# Patient Record
Sex: MALE | Race: WHITE | Employment: OTHER | URBAN - NONMETROPOLITAN AREA
[De-identification: names, ages, dates, MRNs, and addresses within clinical notes are randomized per-mention and may not be internally consistent; named-entity substitution may affect disease eponyms.]

---

## 2018-08-02 ENCOUNTER — HOSPITAL ENCOUNTER (OUTPATIENT)
Age: 58
Setting detail: OBSERVATION
Discharge: HOME | End: 2018-08-05
Attending: EMERGENCY MEDICINE | Admitting: INTERNAL MEDICINE
Payer: COMMERCIAL

## 2018-08-02 ENCOUNTER — APPOINTMENT (OUTPATIENT)
Dept: GENERAL RADIOLOGY | Age: 58
End: 2018-08-02
Payer: COMMERCIAL

## 2018-08-02 DIAGNOSIS — R07.89 CHEST DISCOMFORT: Primary | ICD-10-CM

## 2018-08-02 PROBLEM — R07.9 CHEST PAIN: Status: ACTIVE | Noted: 2018-08-02

## 2018-08-02 LAB
ALBUMIN SERPL-MCNC: 4 G/DL (ref 3.5–5.2)
ALP BLD-CCNC: 53 U/L (ref 40–130)
ALT SERPL-CCNC: 51 U/L (ref 5–41)
ANION GAP SERPL CALCULATED.3IONS-SCNC: 14 MMOL/L (ref 7–19)
AST SERPL-CCNC: 36 U/L (ref 5–40)
BASOPHILS ABSOLUTE: 0 K/UL (ref 0–0.2)
BASOPHILS RELATIVE PERCENT: 0.4 % (ref 0–1)
BILIRUB SERPL-MCNC: 0.5 MG/DL (ref 0.2–1.2)
BUN BLDV-MCNC: 13 MG/DL (ref 6–20)
CALCIUM SERPL-MCNC: 9.1 MG/DL (ref 8.6–10)
CHLORIDE BLD-SCNC: 101 MMOL/L (ref 98–111)
CO2: 20 MMOL/L (ref 22–29)
CREAT SERPL-MCNC: 0.8 MG/DL (ref 0.5–1.2)
EOSINOPHILS ABSOLUTE: 0 K/UL (ref 0–0.6)
EOSINOPHILS RELATIVE PERCENT: 0.5 % (ref 0–5)
GFR NON-AFRICAN AMERICAN: >60
GLUCOSE BLD-MCNC: 111 MG/DL (ref 70–99)
GLUCOSE BLD-MCNC: 116 MG/DL (ref 74–109)
HCT VFR BLD CALC: 47.4 % (ref 42–52)
HEMOGLOBIN: 16.3 G/DL (ref 14–18)
LYMPHOCYTES ABSOLUTE: 1.4 K/UL (ref 1.1–4.5)
LYMPHOCYTES RELATIVE PERCENT: 17 % (ref 20–40)
MCH RBC QN AUTO: 31.2 PG (ref 27–31)
MCHC RBC AUTO-ENTMCNC: 34.4 G/DL (ref 33–37)
MCV RBC AUTO: 90.8 FL (ref 80–94)
MONOCYTES ABSOLUTE: 0.7 K/UL (ref 0–0.9)
MONOCYTES RELATIVE PERCENT: 9.2 % (ref 0–10)
NEUTROPHILS ABSOLUTE: 5.8 K/UL (ref 1.5–7.5)
NEUTROPHILS RELATIVE PERCENT: 72.2 % (ref 50–65)
PDW BLD-RTO: 12.6 % (ref 11.5–14.5)
PERFORMED ON: ABNORMAL
PLATELET # BLD: 198 K/UL (ref 130–400)
PMV BLD AUTO: 8.9 FL (ref 9.4–12.4)
POTASSIUM SERPL-SCNC: 3.6 MMOL/L (ref 3.5–5)
RBC # BLD: 5.22 M/UL (ref 4.7–6.1)
SODIUM BLD-SCNC: 135 MMOL/L (ref 136–145)
TOTAL PROTEIN: 6.9 G/DL (ref 6.6–8.7)
TROPONIN: <0.01 NG/ML (ref 0–0.03)
TROPONIN: <0.01 NG/ML (ref 0–0.03)
WBC # BLD: 8.1 K/UL (ref 4.8–10.8)

## 2018-08-02 PROCEDURE — 93005 ELECTROCARDIOGRAM TRACING: CPT

## 2018-08-02 PROCEDURE — 6360000002 HC RX W HCPCS: Performed by: EMERGENCY MEDICINE

## 2018-08-02 PROCEDURE — 80053 COMPREHEN METABOLIC PANEL: CPT

## 2018-08-02 PROCEDURE — G0378 HOSPITAL OBSERVATION PER HR: HCPCS

## 2018-08-02 PROCEDURE — 96372 THER/PROPH/DIAG INJ SC/IM: CPT

## 2018-08-02 PROCEDURE — 82948 REAGENT STRIP/BLOOD GLUCOSE: CPT

## 2018-08-02 PROCEDURE — 99285 EMERGENCY DEPT VISIT HI MDM: CPT | Performed by: EMERGENCY MEDICINE

## 2018-08-02 PROCEDURE — 6370000000 HC RX 637 (ALT 250 FOR IP): Performed by: INTERNAL MEDICINE

## 2018-08-02 PROCEDURE — 71045 X-RAY EXAM CHEST 1 VIEW: CPT

## 2018-08-02 PROCEDURE — 99220 PR INITIAL OBSERVATION CARE/DAY 70 MINUTES: CPT | Performed by: INTERNAL MEDICINE

## 2018-08-02 PROCEDURE — 6360000002 HC RX W HCPCS: Performed by: INTERNAL MEDICINE

## 2018-08-02 PROCEDURE — 84484 ASSAY OF TROPONIN QUANT: CPT

## 2018-08-02 PROCEDURE — 99285 EMERGENCY DEPT VISIT HI MDM: CPT

## 2018-08-02 PROCEDURE — 85025 COMPLETE CBC W/AUTO DIFF WBC: CPT

## 2018-08-02 PROCEDURE — 6370000000 HC RX 637 (ALT 250 FOR IP): Performed by: HOSPITALIST

## 2018-08-02 PROCEDURE — 2580000003 HC RX 258: Performed by: INTERNAL MEDICINE

## 2018-08-02 PROCEDURE — 36415 COLL VENOUS BLD VENIPUNCTURE: CPT

## 2018-08-02 RX ORDER — SODIUM CHLORIDE 0.9 % (FLUSH) 0.9 %
10 SYRINGE (ML) INJECTION PRN
Status: DISCONTINUED | OUTPATIENT
Start: 2018-08-02 | End: 2018-08-05 | Stop reason: SDUPTHER

## 2018-08-02 RX ORDER — DOCUSATE SODIUM 100 MG/1
100 CAPSULE, LIQUID FILLED ORAL 2 TIMES DAILY
Status: DISCONTINUED | OUTPATIENT
Start: 2018-08-02 | End: 2018-08-05 | Stop reason: HOSPADM

## 2018-08-02 RX ORDER — VARDENAFIL HYDROCHLORIDE 20 MG/1
20 TABLET ORAL PRN
COMMUNITY

## 2018-08-02 RX ORDER — VITAMIN B COMPLEX
1 CAPSULE ORAL DAILY
COMMUNITY

## 2018-08-02 RX ORDER — ACETAMINOPHEN 325 MG/1
650 TABLET ORAL EVERY 4 HOURS PRN
Status: DISCONTINUED | OUTPATIENT
Start: 2018-08-02 | End: 2018-08-05 | Stop reason: HOSPADM

## 2018-08-02 RX ORDER — INSULIN GLARGINE 100 [IU]/ML
75 INJECTION, SOLUTION SUBCUTANEOUS NIGHTLY
COMMUNITY

## 2018-08-02 RX ORDER — LANOLIN ALCOHOL/MO/W.PET/CERES
3 CREAM (GRAM) TOPICAL NIGHTLY PRN
Status: DISCONTINUED | OUTPATIENT
Start: 2018-08-02 | End: 2018-08-05 | Stop reason: HOSPADM

## 2018-08-02 RX ORDER — REPAGLINIDE 1 MG/1
1 TABLET ORAL
COMMUNITY

## 2018-08-02 RX ORDER — OMEPRAZOLE 20 MG/1
20 CAPSULE, DELAYED RELEASE ORAL DAILY
COMMUNITY

## 2018-08-02 RX ORDER — MAGNESIUM OXIDE 400 MG/1
400 TABLET ORAL DAILY
COMMUNITY

## 2018-08-02 RX ORDER — PIOGLITAZONEHYDROCHLORIDE 15 MG/1
15 TABLET ORAL DAILY
COMMUNITY

## 2018-08-02 RX ORDER — VALSARTAN 160 MG/1
160 TABLET ORAL DAILY
COMMUNITY

## 2018-08-02 RX ORDER — MORPHINE SULFATE 1 MG/ML
4 INJECTION, SOLUTION EPIDURAL; INTRATHECAL; INTRAVENOUS ONCE
Status: COMPLETED | OUTPATIENT
Start: 2018-08-02 | End: 2018-08-02

## 2018-08-02 RX ORDER — SIMETHICONE 80 MG
80 TABLET,CHEWABLE ORAL EVERY 6 HOURS PRN
COMMUNITY

## 2018-08-02 RX ORDER — SODIUM CHLORIDE 0.9 % (FLUSH) 0.9 %
10 SYRINGE (ML) INJECTION EVERY 12 HOURS SCHEDULED
Status: DISCONTINUED | OUTPATIENT
Start: 2018-08-02 | End: 2018-08-05 | Stop reason: SDUPTHER

## 2018-08-02 RX ORDER — PANTOPRAZOLE SODIUM 40 MG/1
40 TABLET, DELAYED RELEASE ORAL
Status: DISCONTINUED | OUTPATIENT
Start: 2018-08-03 | End: 2018-08-05 | Stop reason: HOSPADM

## 2018-08-02 RX ORDER — GABAPENTIN 600 MG/1
800 TABLET ORAL 3 TIMES DAILY
COMMUNITY

## 2018-08-02 RX ORDER — FLUOXETINE 10 MG/1
10 CAPSULE ORAL DAILY
Status: DISCONTINUED | OUTPATIENT
Start: 2018-08-02 | End: 2018-08-05 | Stop reason: HOSPADM

## 2018-08-02 RX ORDER — DOCUSATE SODIUM 100 MG/1
100 CAPSULE, LIQUID FILLED ORAL 2 TIMES DAILY
COMMUNITY

## 2018-08-02 RX ORDER — CYCLOBENZAPRINE HCL 10 MG
10 TABLET ORAL 3 TIMES DAILY PRN
Status: DISCONTINUED | OUTPATIENT
Start: 2018-08-02 | End: 2018-08-05 | Stop reason: HOSPADM

## 2018-08-02 RX ORDER — CYCLOBENZAPRINE HCL 10 MG
10 TABLET ORAL 3 TIMES DAILY PRN
COMMUNITY

## 2018-08-02 RX ORDER — VALSARTAN 160 MG/1
160 TABLET ORAL DAILY
Status: DISCONTINUED | OUTPATIENT
Start: 2018-08-02 | End: 2018-08-05 | Stop reason: HOSPADM

## 2018-08-02 RX ORDER — ATORVASTATIN CALCIUM 10 MG/1
10 TABLET, FILM COATED ORAL DAILY
COMMUNITY

## 2018-08-02 RX ORDER — MORPHINE SULFATE 1 MG/ML
2 INJECTION, SOLUTION EPIDURAL; INTRATHECAL; INTRAVENOUS ONCE
Status: COMPLETED | OUTPATIENT
Start: 2018-08-02 | End: 2018-08-02

## 2018-08-02 RX ORDER — ONDANSETRON 2 MG/ML
4 INJECTION INTRAMUSCULAR; INTRAVENOUS EVERY 6 HOURS PRN
Status: DISCONTINUED | OUTPATIENT
Start: 2018-08-02 | End: 2018-08-05 | Stop reason: HOSPADM

## 2018-08-02 RX ORDER — FLUOXETINE 10 MG/1
10 CAPSULE ORAL DAILY
COMMUNITY

## 2018-08-02 RX ORDER — ATORVASTATIN CALCIUM 10 MG/1
10 TABLET, FILM COATED ORAL DAILY
Status: DISCONTINUED | OUTPATIENT
Start: 2018-08-02 | End: 2018-08-05 | Stop reason: HOSPADM

## 2018-08-02 RX ORDER — GABAPENTIN 400 MG/1
800 CAPSULE ORAL 3 TIMES DAILY
Status: DISCONTINUED | OUTPATIENT
Start: 2018-08-02 | End: 2018-08-05 | Stop reason: HOSPADM

## 2018-08-02 RX ORDER — SIMETHICONE 80 MG
80 TABLET,CHEWABLE ORAL EVERY 6 HOURS PRN
Status: DISCONTINUED | OUTPATIENT
Start: 2018-08-02 | End: 2018-08-05 | Stop reason: HOSPADM

## 2018-08-02 RX ORDER — ASPIRIN 81 MG/1
81 TABLET, CHEWABLE ORAL DAILY
Status: DISCONTINUED | OUTPATIENT
Start: 2018-08-03 | End: 2018-08-02 | Stop reason: SDUPTHER

## 2018-08-02 RX ORDER — NITROGLYCERIN 0.4 MG/1
0.4 TABLET SUBLINGUAL EVERY 5 MIN PRN
Status: DISCONTINUED | OUTPATIENT
Start: 2018-08-02 | End: 2018-08-05 | Stop reason: HOSPADM

## 2018-08-02 RX ORDER — ACETAMINOPHEN 160 MG/5ML
650 SOLUTION ORAL ONCE
Status: COMPLETED | OUTPATIENT
Start: 2018-08-02 | End: 2018-08-02

## 2018-08-02 RX ORDER — ASPIRIN 81 MG/1
81 TABLET, CHEWABLE ORAL DAILY
Status: DISCONTINUED | OUTPATIENT
Start: 2018-08-02 | End: 2018-08-05 | Stop reason: HOSPADM

## 2018-08-02 RX ORDER — ONDANSETRON 2 MG/ML
4 INJECTION INTRAMUSCULAR; INTRAVENOUS ONCE
Status: COMPLETED | OUTPATIENT
Start: 2018-08-02 | End: 2018-08-02

## 2018-08-02 RX ADMIN — ONDANSETRON 4 MG: 2 INJECTION INTRAMUSCULAR; INTRAVENOUS at 14:18

## 2018-08-02 RX ADMIN — ENOXAPARIN SODIUM 40 MG: 40 INJECTION SUBCUTANEOUS at 22:03

## 2018-08-02 RX ADMIN — DOCUSATE SODIUM 100 MG: 100 CAPSULE, LIQUID FILLED ORAL at 22:03

## 2018-08-02 RX ADMIN — FLUOXETINE 10 MG: 10 CAPSULE ORAL at 22:03

## 2018-08-02 RX ADMIN — VALSARTAN 160 MG: 160 TABLET, FILM COATED ORAL at 22:04

## 2018-08-02 RX ADMIN — MELATONIN 3 MG ORAL TABLET 3 MG: 3 TABLET ORAL at 23:18

## 2018-08-02 RX ADMIN — Medication 10 ML: at 19:56

## 2018-08-02 RX ADMIN — GABAPENTIN 800 MG: 400 CAPSULE ORAL at 19:55

## 2018-08-02 RX ADMIN — ACETAMINOPHEN 650 MG: 325 SOLUTION ORAL at 23:17

## 2018-08-02 RX ADMIN — Medication 2 MG: at 15:15

## 2018-08-02 RX ADMIN — ATORVASTATIN CALCIUM 10 MG: 10 TABLET, FILM COATED ORAL at 22:03

## 2018-08-02 RX ADMIN — ACETAMINOPHEN 650 MG: 325 TABLET ORAL at 19:54

## 2018-08-02 RX ADMIN — Medication 4 MG: at 14:18

## 2018-08-02 RX ADMIN — CYCLOBENZAPRINE HYDROCHLORIDE 10 MG: 10 TABLET, FILM COATED ORAL at 19:56

## 2018-08-02 RX ADMIN — ASPIRIN 81 MG CHEWABLE TABLET 81 MG: 81 TABLET CHEWABLE at 22:04

## 2018-08-02 ASSESSMENT — PAIN DESCRIPTION - LOCATION
LOCATION: CHEST;BACK;LEG
LOCATION: BACK;CHEST;LEG

## 2018-08-02 ASSESSMENT — PAIN DESCRIPTION - PAIN TYPE
TYPE: ACUTE PAIN

## 2018-08-02 ASSESSMENT — PAIN SCALES - GENERAL
PAINLEVEL_OUTOF10: 5
PAINLEVEL_OUTOF10: 5
PAINLEVEL_OUTOF10: 6
PAINLEVEL_OUTOF10: 5
PAINLEVEL_OUTOF10: 8
PAINLEVEL_OUTOF10: 8
PAINLEVEL_OUTOF10: 4
PAINLEVEL_OUTOF10: 5
PAINLEVEL_OUTOF10: 8
PAINLEVEL_OUTOF10: 4
PAINLEVEL_OUTOF10: 2

## 2018-08-02 ASSESSMENT — ENCOUNTER SYMPTOMS
BACK PAIN: 1
SHORTNESS OF BREATH: 1
ABDOMINAL PAIN: 0
COUGH: 0
NAUSEA: 1
VOMITING: 1

## 2018-08-02 NOTE — ED PROVIDER NOTES
[facility] EMERGENCY DEPT  eMERGENCY dEPARTMENT eNCOUnter      Pt Name: Sherry Ferris  MRN: [id number]  [place] 1960  Date of evaluation: 8/2/2018  Provider: Fanny Hernandez MD    [address] [place] [address]       Chief Complaint   Patient presents with    Chest Pain     Neck/shooulder pain with vomiting          HISTORY OF PRESENT ILLNESS   (Location/Symptom, Timing/Onset, Context/Setting, Quality, Duration, Modifying Factors, Severity)  Note limiting factors. Sherry Ferris is a 62 y.o. male who presents to the emergency department      The history is provided by the patient. Chest Pain   Pain location:  L chest and R chest  Pain quality: pressure    Radiates to: chronic neck \"spasms\", relative rubbing neck, \"VA gives him Morphine for it\"  Pain severity now: 8/10. Onset quality:  Sudden  Duration: 1130. Timing:  Constant  Progression:  Unchanged  Chronicity:  New  Context comment:  No past cath, unknown last stress  Relieved by:  None tried  Worsened by:  Nothing  Ineffective treatments:  Nitroglycerin and aspirin (en route)  Associated symptoms: back pain (chronic), diaphoresis, fatigue, nausea, numbness (bilateral hands, ? hyperventilation), shortness of breath, vomiting (x 1) and weakness    Associated symptoms: no abdominal pain, no cough, no headache, no lower extremity edema, no near-syncope and no palpitations        Nursing Notes were reviewed. REVIEW OF SYSTEMS    (2-9 systems for level 4, 10 or more for level 5)     Review of Systems   Constitutional: Positive for diaphoresis and fatigue. Respiratory: Positive for shortness of breath. Negative for cough. Cardiovascular: Positive for chest pain. Negative for palpitations and near-syncope. Gastrointestinal: Positive for nausea and vomiting (x 1). Negative for abdominal pain. Musculoskeletal: Positive for back pain (chronic). Neurological: Positive for weakness and numbness (bilateral hands, ? hyperventilation). Negative for headaches.    All other systems reviewed and are negative. Except as noted above the remainder of the review of systems was reviewed and negative. PAST MEDICAL HISTORY     Past Medical History:   Diagnosis Date    [facility]     Cerebral artery occlusion with cerebral infarction ([facility]     Depression     Diabetes mellitus ([facility]     Headache     Hypertension     PTSD (post-traumatic stress disorder)     TBI (traumatic brain injury) ([facility]          SURGICAL HISTORY       Past Surgical History:   Procedure Laterality Date    ELBOW SURGERY Bilateral     SHOULDER SURGERY Bilateral          CURRENT MEDICATIONS       Previous Medications    ASPIRIN 81 MG TABLET    Take 81 mg by mouth daily    ATORVASTATIN (LIPITOR) 10 MG TABLET    Take 10 mg by mouth daily    B COMPLEX VITAMINS CAPSULE    Take 1 capsule by mouth daily    CYCLOBENZAPRINE (FLEXERIL) 10 MG TABLET    Take 10 mg by mouth 3 times daily as needed for Muscle spasms    DOCUSATE SODIUM (COLACE) 100 MG CAPSULE    Take 100 mg by mouth 2 times daily    FLUOXETINE (PROZAC) 10 MG CAPSULE    Take 10 mg by mouth daily    FLUTICASONE FUROATE NA    by Nasal route    GABAPENTIN (NEURONTIN) 600 MG TABLET    Take 800 mg by mouth 3 times daily. .    INSULIN GLARGINE (LANTUS) 100 UNIT/ML INJECTION VIAL    Inject into the skin nightly    LIRAGLUTIDE (VICTOZA SC)    Inject into the skin    MAGNESIUM OXIDE (MAG-OX) 400 MG TABLET    Take 400 mg by mouth daily    METFORMIN (GLUCOPHAGE) 500 MG TABLET    Take 500 mg by mouth 2 times daily (with meals)    OMEPRAZOLE (PRILOSEC) 20 MG DELAYED RELEASE CAPSULE    Take 20 mg by mouth daily    PIOGLITAZONE (ACTOS) 15 MG TABLET    Take 15 mg by mouth daily    REPAGLINIDE (PRANDIN) 1 MG TABLET    Take 1 mg by mouth 3 times daily (before meals)    SIMETHICONE (MYLICON) 80 MG CHEWABLE TABLET    Take 80 mg by mouth every 6 hours as needed for Flatulence    VALSARTAN (DIOVAN) 160 MG TABLET    Take 160 mg by mouth daily    VARDENAFIL otherwise noted below, none     Procedures    FINAL IMPRESSION      1. Chest discomfort          DISPOSITION/PLAN   DISPOSITION        PATIENT REFERRED TO:  No follow-up provider specified.     DISCHARGE MEDICATIONS:  New Prescriptions    No medications on file          (Please note that portions of this note were completed with a voice recognition program.  Efforts were made to edit the dictations but occasionally words are mis-transcribed.)    Kobe Merino MD (electronically signed)  Attending Emergency Physician          Kobe Merino MD  08/02/18 [id number]

## 2018-08-02 NOTE — ED NOTES
Bed: 18  Expected date:   Expected time:   Means of arrival:   Comments:  malik Sheikh RN  08/02/18 [id number]

## 2018-08-02 NOTE — ED NOTES
No bed request for admission found Dr Pruitt Never notified and request made Awaiting room assignment for further      Denise Martini RN  08/02/18 8694

## 2018-08-03 ENCOUNTER — APPOINTMENT (OUTPATIENT)
Dept: NUCLEAR MEDICINE | Age: 58
End: 2018-08-03
Payer: COMMERCIAL

## 2018-08-03 LAB
GLUCOSE BLD-MCNC: 137 MG/DL (ref 70–99)
GLUCOSE BLD-MCNC: 167 MG/DL (ref 70–99)
GLUCOSE BLD-MCNC: 81 MG/DL (ref 70–99)
GLUCOSE BLD-MCNC: 92 MG/DL (ref 70–99)
PERFORMED ON: ABNORMAL
PERFORMED ON: ABNORMAL
PERFORMED ON: NORMAL
PERFORMED ON: NORMAL

## 2018-08-03 PROCEDURE — 93017 CV STRESS TEST TRACING ONLY: CPT

## 2018-08-03 PROCEDURE — 2700000000 HC OXYGEN THERAPY PER DAY

## 2018-08-03 PROCEDURE — 2580000003 HC RX 258: Performed by: INTERNAL MEDICINE

## 2018-08-03 PROCEDURE — 96372 THER/PROPH/DIAG INJ SC/IM: CPT

## 2018-08-03 PROCEDURE — 78452 HT MUSCLE IMAGE SPECT MULT: CPT

## 2018-08-03 PROCEDURE — 99224 PR SBSQ OBSERVATION CARE/DAY 15 MINUTES: CPT | Performed by: INTERNAL MEDICINE

## 2018-08-03 PROCEDURE — G0378 HOSPITAL OBSERVATION PER HR: HCPCS

## 2018-08-03 PROCEDURE — 82948 REAGENT STRIP/BLOOD GLUCOSE: CPT

## 2018-08-03 PROCEDURE — 6370000000 HC RX 637 (ALT 250 FOR IP): Performed by: HOSPITALIST

## 2018-08-03 PROCEDURE — 6370000000 HC RX 637 (ALT 250 FOR IP): Performed by: INTERNAL MEDICINE

## 2018-08-03 PROCEDURE — 6360000002 HC RX W HCPCS: Performed by: INTERNAL MEDICINE

## 2018-08-03 PROCEDURE — A9500 TC99M SESTAMIBI: HCPCS | Performed by: INTERNAL MEDICINE

## 2018-08-03 PROCEDURE — 3430000000 HC RX DIAGNOSTIC RADIOPHARMACEUTICAL: Performed by: INTERNAL MEDICINE

## 2018-08-03 RX ORDER — NICOTINE POLACRILEX 4 MG
15 LOZENGE BUCCAL PRN
Status: DISCONTINUED | OUTPATIENT
Start: 2018-08-03 | End: 2018-08-05 | Stop reason: HOSPADM

## 2018-08-03 RX ORDER — DEXTROSE MONOHYDRATE 25 G/50ML
12.5 INJECTION, SOLUTION INTRAVENOUS PRN
Status: DISCONTINUED | OUTPATIENT
Start: 2018-08-03 | End: 2018-08-05 | Stop reason: HOSPADM

## 2018-08-03 RX ORDER — DEXTROSE MONOHYDRATE 50 MG/ML
100 INJECTION, SOLUTION INTRAVENOUS PRN
Status: DISCONTINUED | OUTPATIENT
Start: 2018-08-03 | End: 2018-08-05 | Stop reason: HOSPADM

## 2018-08-03 RX ADMIN — INSULIN LISPRO 3 UNITS: 100 INJECTION, SOLUTION INTRAVENOUS; SUBCUTANEOUS at 23:08

## 2018-08-03 RX ADMIN — VALSARTAN 160 MG: 160 TABLET, FILM COATED ORAL at 11:28

## 2018-08-03 RX ADMIN — MELATONIN 3 MG ORAL TABLET 3 MG: 3 TABLET ORAL at 21:06

## 2018-08-03 RX ADMIN — ATORVASTATIN CALCIUM 10 MG: 10 TABLET, FILM COATED ORAL at 11:27

## 2018-08-03 RX ADMIN — DOCUSATE SODIUM 100 MG: 100 CAPSULE, LIQUID FILLED ORAL at 20:57

## 2018-08-03 RX ADMIN — CYCLOBENZAPRINE HYDROCHLORIDE 10 MG: 10 TABLET, FILM COATED ORAL at 21:06

## 2018-08-03 RX ADMIN — CYCLOBENZAPRINE HYDROCHLORIDE 10 MG: 10 TABLET, FILM COATED ORAL at 11:33

## 2018-08-03 RX ADMIN — DOCUSATE SODIUM 100 MG: 100 CAPSULE, LIQUID FILLED ORAL at 11:28

## 2018-08-03 RX ADMIN — GABAPENTIN 800 MG: 400 CAPSULE ORAL at 20:57

## 2018-08-03 RX ADMIN — Medication 10 ML: at 11:29

## 2018-08-03 RX ADMIN — GABAPENTIN 800 MG: 400 CAPSULE ORAL at 11:28

## 2018-08-03 RX ADMIN — Medication 10 ML: at 20:58

## 2018-08-03 RX ADMIN — ENOXAPARIN SODIUM 40 MG: 40 INJECTION SUBCUTANEOUS at 16:53

## 2018-08-03 RX ADMIN — PANTOPRAZOLE SODIUM 40 MG: 40 TABLET, DELAYED RELEASE ORAL at 05:51

## 2018-08-03 RX ADMIN — GABAPENTIN 800 MG: 400 CAPSULE ORAL at 16:53

## 2018-08-03 RX ADMIN — FLUOXETINE 10 MG: 10 CAPSULE ORAL at 11:28

## 2018-08-03 RX ADMIN — TETRAKIS(2-METHOXYISOBUTYLISOCYANIDE)COPPER(I) TETRAFLUOROBORATE 30 MILLICURIE: 1 INJECTION, POWDER, LYOPHILIZED, FOR SOLUTION INTRAVENOUS at 11:11

## 2018-08-03 RX ADMIN — REGADENOSON 0.4 MG: 0.08 INJECTION, SOLUTION INTRAVENOUS at 11:11

## 2018-08-03 RX ADMIN — TETRAKIS(2-METHOXYISOBUTYLISOCYANIDE)COPPER(I) TETRAFLUOROBORATE 10 MILLICURIE: 1 INJECTION, POWDER, LYOPHILIZED, FOR SOLUTION INTRAVENOUS at 11:11

## 2018-08-03 RX ADMIN — ASPIRIN 81 MG CHEWABLE TABLET 81 MG: 81 TABLET CHEWABLE at 11:28

## 2018-08-03 ASSESSMENT — PAIN SCALES - GENERAL
PAINLEVEL_OUTOF10: 0

## 2018-08-03 NOTE — ED NOTES
Pt report called to PCU to Kiko BARBOZA to take over pt care Pt admission in progress      Bell Ring RN  08/02/18 4111

## 2018-08-04 LAB
GLUCOSE BLD-MCNC: 122 MG/DL (ref 70–99)
GLUCOSE BLD-MCNC: 153 MG/DL (ref 70–99)
GLUCOSE BLD-MCNC: 203 MG/DL (ref 70–99)
GLUCOSE BLD-MCNC: 228 MG/DL (ref 70–99)
PERFORMED ON: ABNORMAL

## 2018-08-04 PROCEDURE — G0378 HOSPITAL OBSERVATION PER HR: HCPCS

## 2018-08-04 PROCEDURE — 6370000000 HC RX 637 (ALT 250 FOR IP): Performed by: INTERNAL MEDICINE

## 2018-08-04 PROCEDURE — 96376 TX/PRO/DX INJ SAME DRUG ADON: CPT

## 2018-08-04 PROCEDURE — 2580000003 HC RX 258: Performed by: INTERNAL MEDICINE

## 2018-08-04 PROCEDURE — 2700000000 HC OXYGEN THERAPY PER DAY

## 2018-08-04 PROCEDURE — 6360000002 HC RX W HCPCS: Performed by: INTERNAL MEDICINE

## 2018-08-04 PROCEDURE — 6370000000 HC RX 637 (ALT 250 FOR IP): Performed by: HOSPITALIST

## 2018-08-04 PROCEDURE — 99205 OFFICE O/P NEW HI 60 MIN: CPT | Performed by: INTERNAL MEDICINE

## 2018-08-04 PROCEDURE — 99225 PR SBSQ OBSERVATION CARE/DAY 25 MINUTES: CPT | Performed by: INTERNAL MEDICINE

## 2018-08-04 PROCEDURE — 96375 TX/PRO/DX INJ NEW DRUG ADDON: CPT

## 2018-08-04 PROCEDURE — 82948 REAGENT STRIP/BLOOD GLUCOSE: CPT

## 2018-08-04 PROCEDURE — 96372 THER/PROPH/DIAG INJ SC/IM: CPT

## 2018-08-04 PROCEDURE — 96374 THER/PROPH/DIAG INJ IV PUSH: CPT

## 2018-08-04 RX ORDER — SODIUM CHLORIDE 0.9 % (FLUSH) 0.9 %
10 SYRINGE (ML) INJECTION PRN
Status: DISCONTINUED | OUTPATIENT
Start: 2018-08-04 | End: 2018-08-05 | Stop reason: SDUPTHER

## 2018-08-04 RX ORDER — HYDROCODONE BITARTRATE AND ACETAMINOPHEN 5; 325 MG/1; MG/1
1 TABLET ORAL EVERY 4 HOURS PRN
Status: DISCONTINUED | OUTPATIENT
Start: 2018-08-04 | End: 2018-08-05 | Stop reason: HOSPADM

## 2018-08-04 RX ORDER — SODIUM CHLORIDE 0.9 % (FLUSH) 0.9 %
10 SYRINGE (ML) INJECTION EVERY 12 HOURS SCHEDULED
Status: DISCONTINUED | OUTPATIENT
Start: 2018-08-04 | End: 2018-08-05 | Stop reason: SDUPTHER

## 2018-08-04 RX ADMIN — Medication 10 ML: at 09:25

## 2018-08-04 RX ADMIN — FLUOXETINE 10 MG: 10 CAPSULE ORAL at 09:26

## 2018-08-04 RX ADMIN — INSULIN LISPRO 3 UNITS: 100 INJECTION, SOLUTION INTRAVENOUS; SUBCUTANEOUS at 11:54

## 2018-08-04 RX ADMIN — Medication 10 ML: at 20:06

## 2018-08-04 RX ADMIN — ENOXAPARIN SODIUM 40 MG: 40 INJECTION SUBCUTANEOUS at 17:16

## 2018-08-04 RX ADMIN — GABAPENTIN 400 MG: 400 CAPSULE ORAL at 20:04

## 2018-08-04 RX ADMIN — DOCUSATE SODIUM 100 MG: 100 CAPSULE, LIQUID FILLED ORAL at 20:04

## 2018-08-04 RX ADMIN — ASPIRIN 81 MG CHEWABLE TABLET 81 MG: 81 TABLET CHEWABLE at 09:25

## 2018-08-04 RX ADMIN — GABAPENTIN 800 MG: 400 CAPSULE ORAL at 09:25

## 2018-08-04 RX ADMIN — INSULIN LISPRO 3 UNITS: 100 INJECTION, SOLUTION INTRAVENOUS; SUBCUTANEOUS at 22:06

## 2018-08-04 RX ADMIN — ATORVASTATIN CALCIUM 10 MG: 10 TABLET, FILM COATED ORAL at 09:25

## 2018-08-04 RX ADMIN — VALSARTAN 160 MG: 160 TABLET, FILM COATED ORAL at 09:25

## 2018-08-04 RX ADMIN — CYCLOBENZAPRINE HYDROCHLORIDE 10 MG: 10 TABLET, FILM COATED ORAL at 20:04

## 2018-08-04 RX ADMIN — PANTOPRAZOLE SODIUM 40 MG: 40 TABLET, DELAYED RELEASE ORAL at 09:28

## 2018-08-04 RX ADMIN — INSULIN LISPRO 6 UNITS: 100 INJECTION, SOLUTION INTRAVENOUS; SUBCUTANEOUS at 17:17

## 2018-08-04 RX ADMIN — DOCUSATE SODIUM 100 MG: 100 CAPSULE, LIQUID FILLED ORAL at 09:25

## 2018-08-04 RX ADMIN — GABAPENTIN 800 MG: 400 CAPSULE ORAL at 14:46

## 2018-08-04 RX ADMIN — HYDROCODONE BITARTRATE AND ACETAMINOPHEN 1 TABLET: 5; 325 TABLET ORAL at 20:04

## 2018-08-04 ASSESSMENT — PAIN DESCRIPTION - LOCATION: LOCATION: BACK

## 2018-08-04 ASSESSMENT — PAIN SCALES - GENERAL
PAINLEVEL_OUTOF10: 0
PAINLEVEL_OUTOF10: 6
PAINLEVEL_OUTOF10: 5

## 2018-08-04 ASSESSMENT — PAIN DESCRIPTION - ORIENTATION: ORIENTATION: LOWER

## 2018-08-04 ASSESSMENT — PAIN DESCRIPTION - PAIN TYPE: TYPE: CHRONIC PAIN

## 2018-08-05 VITALS
SYSTOLIC BLOOD PRESSURE: 130 MMHG | OXYGEN SATURATION: 96 % | WEIGHT: 279.3 LBS | DIASTOLIC BLOOD PRESSURE: 88 MMHG | TEMPERATURE: 98.2 F | HEIGHT: 71 IN | RESPIRATION RATE: 16 BRPM | BODY MASS INDEX: 39.1 KG/M2 | HEART RATE: 75 BPM

## 2018-08-05 LAB
ANION GAP SERPL CALCULATED.3IONS-SCNC: 10 MMOL/L (ref 7–19)
BUN BLDV-MCNC: 13 MG/DL (ref 6–20)
CALCIUM SERPL-MCNC: 8.7 MG/DL (ref 8.6–10)
CHLORIDE BLD-SCNC: 101 MMOL/L (ref 98–111)
CO2: 28 MMOL/L (ref 22–29)
CREAT SERPL-MCNC: 1 MG/DL (ref 0.5–1.2)
EKG P AXIS: 56 DEGREES
EKG P AXIS: 68 DEGREES
EKG P-R INTERVAL: 140 MS
EKG P-R INTERVAL: 168 MS
EKG Q-T INTERVAL: 374 MS
EKG Q-T INTERVAL: 386 MS
EKG QRS DURATION: 104 MS
EKG QRS DURATION: 106 MS
EKG QTC CALCULATION (BAZETT): 412 MS
EKG QTC CALCULATION (BAZETT): 421 MS
EKG T AXIS: 29 DEGREES
EKG T AXIS: 30 DEGREES
GFR NON-AFRICAN AMERICAN: >60
GLUCOSE BLD-MCNC: 122 MG/DL (ref 70–99)
GLUCOSE BLD-MCNC: 148 MG/DL (ref 74–109)
PERFORMED ON: ABNORMAL
POTASSIUM REFLEX MAGNESIUM: 4.4 MMOL/L (ref 3.5–5)
SODIUM BLD-SCNC: 139 MMOL/L (ref 136–145)

## 2018-08-05 PROCEDURE — C1894 INTRO/SHEATH, NON-LASER: HCPCS

## 2018-08-05 PROCEDURE — 93458 L HRT ARTERY/VENTRICLE ANGIO: CPT | Performed by: INTERNAL MEDICINE

## 2018-08-05 PROCEDURE — 2500000003 HC RX 250 WO HCPCS

## 2018-08-05 PROCEDURE — G0378 HOSPITAL OBSERVATION PER HR: HCPCS

## 2018-08-05 PROCEDURE — 99225 PR SBSQ OBSERVATION CARE/DAY 25 MINUTES: CPT | Performed by: INTERNAL MEDICINE

## 2018-08-05 PROCEDURE — 80048 BASIC METABOLIC PNL TOTAL CA: CPT

## 2018-08-05 PROCEDURE — 2580000003 HC RX 258: Performed by: INTERNAL MEDICINE

## 2018-08-05 PROCEDURE — 6370000000 HC RX 637 (ALT 250 FOR IP): Performed by: INTERNAL MEDICINE

## 2018-08-05 PROCEDURE — 2709999900 HC NON-CHARGEABLE SUPPLY

## 2018-08-05 PROCEDURE — C1760 CLOSURE DEV, VASC: HCPCS

## 2018-08-05 PROCEDURE — 93005 ELECTROCARDIOGRAM TRACING: CPT

## 2018-08-05 PROCEDURE — 82948 REAGENT STRIP/BLOOD GLUCOSE: CPT

## 2018-08-05 PROCEDURE — 99024 POSTOP FOLLOW-UP VISIT: CPT | Performed by: INTERNAL MEDICINE

## 2018-08-05 PROCEDURE — 36415 COLL VENOUS BLD VENIPUNCTURE: CPT

## 2018-08-05 PROCEDURE — 6360000002 HC RX W HCPCS

## 2018-08-05 RX ORDER — SODIUM CHLORIDE 0.9 % (FLUSH) 0.9 %
10 SYRINGE (ML) INJECTION PRN
Status: DISCONTINUED | OUTPATIENT
Start: 2018-08-05 | End: 2018-08-05 | Stop reason: HOSPADM

## 2018-08-05 RX ORDER — SODIUM CHLORIDE 0.9 % (FLUSH) 0.9 %
10 SYRINGE (ML) INJECTION EVERY 12 HOURS SCHEDULED
Status: DISCONTINUED | OUTPATIENT
Start: 2018-08-05 | End: 2018-08-05 | Stop reason: HOSPADM

## 2018-08-05 RX ADMIN — DOCUSATE SODIUM 100 MG: 100 CAPSULE, LIQUID FILLED ORAL at 08:37

## 2018-08-05 RX ADMIN — FLUOXETINE 10 MG: 10 CAPSULE ORAL at 08:37

## 2018-08-05 RX ADMIN — CYCLOBENZAPRINE HYDROCHLORIDE 10 MG: 10 TABLET, FILM COATED ORAL at 05:49

## 2018-08-05 RX ADMIN — PANTOPRAZOLE SODIUM 40 MG: 40 TABLET, DELAYED RELEASE ORAL at 05:49

## 2018-08-05 RX ADMIN — ATORVASTATIN CALCIUM 10 MG: 10 TABLET, FILM COATED ORAL at 08:37

## 2018-08-05 RX ADMIN — GABAPENTIN 400 MG: 400 CAPSULE ORAL at 08:37

## 2018-08-05 RX ADMIN — HYDROCODONE BITARTRATE AND ACETAMINOPHEN 1 TABLET: 5; 325 TABLET ORAL at 00:04

## 2018-08-05 RX ADMIN — VALSARTAN 160 MG: 160 TABLET, FILM COATED ORAL at 08:37

## 2018-08-05 RX ADMIN — Medication 10 ML: at 08:37

## 2018-08-05 RX ADMIN — HYDROCODONE BITARTRATE AND ACETAMINOPHEN 1 TABLET: 5; 325 TABLET ORAL at 05:49

## 2018-08-05 RX ADMIN — ASPIRIN 81 MG CHEWABLE TABLET 81 MG: 81 TABLET CHEWABLE at 08:37

## 2018-08-05 ASSESSMENT — PAIN DESCRIPTION - PAIN TYPE
TYPE: CHRONIC PAIN
TYPE: CHRONIC PAIN

## 2018-08-05 ASSESSMENT — PAIN DESCRIPTION - ORIENTATION
ORIENTATION: LOWER
ORIENTATION: LOWER

## 2018-08-05 ASSESSMENT — PAIN SCALES - GENERAL
PAINLEVEL_OUTOF10: 5
PAINLEVEL_OUTOF10: 0
PAINLEVEL_OUTOF10: 7

## 2018-08-05 ASSESSMENT — PAIN DESCRIPTION - LOCATION
LOCATION: BACK
LOCATION: BACK

## 2018-08-05 NOTE — CONSULTS
[facility]      History and Physical           I personally saw the patient and rounded with:  Chapincito Gomez, on  8/4/18      The observations documented in this note, including the assessment and plan are mine              Date of Admission:  8/2/2018  1:26 PM    Date of Initially Being Seen / Consultation:  8/4/18    Cardiologist:  Buddy Kumar MD     Cardiology Attending: [facility] Attending: Hospitalist Service      PCP:  No primary care provider on file. Reason for Consultation or Admission / Chief Complaint:  Chest discomfort    SUBJECTIVE AND HISTORY OF PRESENT ILLNESS:    Source of the history:  Patient, family, previous inpatient and outpatient records in [facility]    Mario Selby is a 62 y.o. male who presents to [facility] ED / PCU with symptoms / signs / problem or diagnosis of chest discomfort. He is visiting [address] camping an developed chest discomfort. The discomfort began in the central chest and radiated to his back. There was dyspnea. The chest discomfort is not exertional and there are no obvious precipitating or relieving features. There were no associated manifestations such as nausea, vomiting, diaphoresis, presyncope, syncope, dizzyness, weakness, cold sweatsWith these symptoms and signs, Cardiology was asked to assist in treatment. When being examined this afternoon, he has had no symptoms of exertional chest discomfort, unusual or change in shortness of air, presyncope or syncope.        Family present:  Yes: wife and daughter      CARDIAC RISK PROFILE:    Risk Factor Yes / No / Unknown       Gender Male   Cigarette Use No   Family History of Cardiovascular Disease N/A   Diabetes Mellitus yes   Hypercholesteremia no   Hypertension yes          Cardiac Specific Problems:    Specialty Problems     None            PRIOR CARDIAC PROBLEM LIST  (IF APPLICABLE):    none      Past Medical History:    Past Medical History:   Diagnosis Date    [facility]     Cerebral [facility] occlusion [facility] infarction [facility]     Depression     Diabetes mellitus ([facility]     Headache     Hypertension     PTSD (post-traumatic stress disorder)     TBI (traumatic [facility] injury) ([facility]          Past Surgical History:    Past Surgical History:   Procedure Laterality Date    ELBOW SURGERY Bilateral     SHOULDER SURGERY Bilateral          Home Medications:   Prior to Admission medications    Medication Sig Start Date End Date Taking? Authorizing Provider   insulin glargine (LANTUS) 100 UNIT/ML injection vial Inject 75 Units into the skin nightly    Yes Historical Provider, MD   Liraglutide (VICTOZA SC) Inject into the skin   Yes Historical Provider, MD   cyclobenzaprine (FLEXERIL) 10 MG tablet Take 10 mg by mouth 3 times daily as needed for Muscle spasms   Yes Historical Provider, MD   gabapentin (NEURONTIN) 600 MG tablet Take 800 mg by mouth 3 times daily. .   Yes Historical Provider, MD   atorvastatin (LIPITOR) 10 MG tablet Take 10 mg by mouth daily   Yes Historical Provider, MD   repaglinide (PRANDIN) 1 MG tablet Take 1 mg by mouth 3 times daily (before meals)   Yes Historical Provider, MD   vitamin D 1000 units CAPS Take by mouth   Yes Historical Provider, MD   FLUoxetine (PROZAC) 10 MG capsule Take 10 mg by mouth daily   Yes Historical Provider, MD   FLUTICASONE FUROATE NA by Nasal route   Yes Historical Provider, MD   metFORMIN (GLUCOPHAGE) 500 MG tablet Take 500 mg by mouth 2 times daily (with meals)   Yes Historical Provider, MD   magnesium oxide (MAG-OX) 400 MG tablet Take 400 mg by mouth daily   Yes Historical Provider, MD   pioglitazone (ACTOS) 15 MG tablet Take 15 mg by mouth daily   Yes Historical Provider, MD   vardenafil (LEVITRA) 20 MG tablet Take 20 mg by mouth as needed for Erectile Dysfunction   Yes Historical Provider, MD   b complex vitamins capsule Take 1 capsule by mouth daily   Yes Historical Provider, MD   docusate sodium (COLACE) 100 MG capsule Take 100 mg by mouth 2 times daily   Yes Historical Provider, MD   valsartan (DIOVAN) 160 MG tablet Take 160 mg by mouth daily   Yes Historical Provider, MD   simethicone (MYLICON) 80 MG chewable tablet Take 80 mg by mouth every 6 hours as needed for Flatulence   Yes Historical Provider, MD   omeprazole (PRILOSEC) 20 MG delayed release capsule Take 20 mg by mouth daily   Yes Historical Provider, MD   aspirin 81 MG tablet Take 81 mg by mouth daily   Yes Historical Provider, MD        Facility Administered Medications:    sodium chloride flush  10 mL Intravenous 2 times per day    insulin lispro  0-18 Units Subcutaneous 4x Daily AC & HS    insulin lispro  0-9 Units Subcutaneous Nightly    sodium chloride flush  10 mL Intravenous 2 times per day    enoxaparin  40 mg Subcutaneous Daily    aspirin  81 mg Oral Daily    atorvastatin  10 mg Oral Daily    docusate sodium  100 mg Oral BID    FLUoxetine  10 mg Oral Daily    gabapentin  800 mg Oral TID    pantoprazole  40 mg Oral QAM AC    valsartan  160 mg Oral Daily       Allergies:  Bee venom; Sulfa antibiotics; and Tetracyclines & related     Social History:       Social History     Social History    Marital status:      Spouse name: N/A    Number of children: N/A    Years of education: N/A     Occupational History    Not on file. Social History Main Topics    Smoking status: Never Smoker    Smokeless tobacco: Never Used    Alcohol use Yes      Comment: occ    Drug use: No    Sexual activity: Not on file     Other Topics Concern    Not on file     Social History Narrative    No narrative on file       Family History:   History reviewed. No pertinent family history.       REVIEW OF SYSTEMS:     Except as noted in the HPI, all other systems are negative        PHYSICAL EXAMINATION:     BP (!) 149/87   Pulse 93   Temp 97.9 °F (36.6 °C) (Temporal)   Resp 18   Ht 5' 11\" (1.803 m)   Wt 269 lb 3.2 oz (122.1 kg)   SpO2

## 2018-08-05 NOTE — DISCHARGE SUMMARY
10 mg by mouth 3 times daily as needed for Muscle spasms      gabapentin (NEURONTIN) 600 MG tablet Take 800 mg by mouth 3 times daily. Angel Quiet atorvastatin (LIPITOR) 10 MG tablet Take 10 mg by mouth daily      repaglinide (PRANDIN) 1 MG tablet Take 1 mg by mouth 3 times daily (before meals)      vitamin D 1000 units CAPS Take by mouth      FLUoxetine (PROZAC) 10 MG capsule Take 10 mg by mouth daily      FLUTICASONE FUROATE NA by Nasal route      metFORMIN (GLUCOPHAGE) 500 MG tablet Take 500 mg by mouth 2 times daily (with meals)      magnesium oxide (MAG-OX) 400 MG tablet Take 400 mg by mouth daily      pioglitazone (ACTOS) 15 MG tablet Take 15 mg by mouth daily      vardenafil (LEVITRA) 20 MG tablet Take 20 mg by mouth as needed for Erectile Dysfunction      b complex vitamins capsule Take 1 capsule by mouth daily      docusate sodium (COLACE) 100 MG capsule Take 100 mg by mouth 2 times daily      valsartan (DIOVAN) 160 MG tablet Take 160 mg by mouth daily      simethicone (MYLICON) 80 MG chewable tablet Take 80 mg by mouth every 6 hours as needed for Flatulence      omeprazole (PRILOSEC) 20 MG delayed release capsule Take 20 mg by mouth daily      aspirin 81 MG tablet Take 81 mg by mouth daily           Activity: activity as tolerated  Diet: cardiac diet  Wound Care: none needed    Follow-up with PCP in 1 week.     Signed:  Riri Rajput M.D.  8/5/2018  3:14 PM

## 2018-08-05 NOTE — PROCEDURES
Cardiac Risk Profile -       Risk Factor Yes / No / Unknown         Gender Male   Cigarette Use No   Family History of Cardiovascular Disease N/A   Diabetes Mellitus yes   Hypercholesteremia no   Hypertension yes         Prior Cardiac History -    8/3/2018  lexiscan Positive for inferior MI + myocardial ischemia, EF 44%, -1% ischemic myocardium on stress, uninterpretable risk findings, AUC indication 21, AUC score 7  8/5/18  Cath mild CAD    Indications for Cardiac Catheterization -  8/3/2018  lexiscan Positive for inferior MI + myocardial ischemia, EF 44%, -1% ischemic myocardium on stress, uninterpretable risk findings, AUC indication 21, AUC score 7, Diagnostic Catheterization Appropriate Use Criteria Description, [facility] 2012;59:[id number]    After obtaining informed written consent, the patient was brought to the catheterization laboratory where the right groin was prepared in the usual sterile fashion. 2% lidocaine was injected above the common femoral artery. Utilizing ultrasound assistance and the Seldinger technique, the common femoral artery was cannulated and bright red pulsatile blood returned. Using fluoroscopy guidance, the J-tip wire was placed in the common femoral artery. A 6-Fr sheath was inserted. Utilizing 6-Sammarinese Karen catheters, selective coronary arteriography was performed followed by left ventriculography. At this stage, the equipment was removed. A right femoral arteriogram was performed to ensure patency of the vessel so that a vascular access closure device could be deployed. A 6-Sammarinese Mynx vascular access closure device was then inserted. Hemostasis was achieved. A sterile dressing was applied. He was taken to his room in stable and satisfactory condition.   The results of the procedure were explained to the family in the cardiac catheterization laboratories consult / waiting room        Complications:  none      Results:    Hemodynamics:      Site Pressure mmHg        Left ventricular pressure 127/7 mmHg   Left ventricular end-diastolic pressure (LVEDP) 13 mmHg   Aortic pressure 129/82 mmHg   Mean aortic pressure 104 mmHg       Angiography:    Coronary Arteries:    Left Main Coronary Artery:  A large vessel which arises from the left sinus of Valsalva. It divides into the left anterior descending coronary artery and the left circumflex. There is mild diffuse disease throughout the entire length of the vessel. Left Anterior Coronary Artery:  The LAD is a moderate sized vessel with several diagonal branches. There is mild diffuse disease throughout the entire length of the vessel. Left Circumflex Coronary Artery:  The LCx is a moderate sized vessel with several marginal branches. There is mild diffuse disease throughout the entire length of the vessel. Right Coronary Artery:  The RCA is a moderate sized dominant vessel which arises from the right sinus of Valsalva. There is mild diffuse disease throughout the entire length of the vessel. Left Ventriculogram:  The left ventriculogram is obtained in the right oblique projection. All regional wall segments move appropriately. The estimated visual ejection fraction is > 60%. There is no mitral regurgitation nor is there a pull back gradient seen across the aortic valve. Summary:      1. Successful femoral artery ultrasound  2. Successful femoral artery arteriogram  3. Mild coronary artery disease  4. Left ventricular function is normal      RECOMMENDATIONS:    1.  Reassurance  2. Risk factor modification  3. Evaluation of etiologies of non cardiac chest discomfort should symptoms persist or progress  4. Follow  Up with Primary care provider as arranged  5.   Follow up with Cardiology \"prn\"       Electronically signed by Angela Dempsey MD on 8/5/18

## 2018-08-06 LAB
LV EF: 44 %
LVEF MODALITY: NORMAL

## 2018-08-06 NOTE — PROGRESS NOTES
Nutrition Assessment    Type and Reason for Visit: Reassess    Nutrition Recommendations: continue current POC    Malnutrition Assessment:  · Malnutrition Status: No malnutrition    Nutrition Diagnosis:   · Problem: Inadequate oral intake  · Etiology: related to Cardiac dysfunction     Signs and symptoms:  as evidenced by NPO status due to medical condition    Nutrition Assessment:  · Subjective Assessment: Diet has been advanced to Carb Control Low sodium diet. Appetite is good with intake %. · Nutrition-Focused Physical Findings: well nourished appearing gentleman  · Wound Type: None  · Current Nutrition Therapies:  · Oral Diet Orders: Carb Control 4 Carbs/Meal, 2gm Sodium   · Oral Diet intake: %  · Oral Nutrition Supplement (ONS) Orders: None    · Anthropometric Measures:  · Ht: 5' 11\" (180.3 cm)   · Current Body Wt: 269 lb 3 oz (122.1 kg)  · Admission Body Wt: 262 lb (118.8 kg) (stated)  · Ideal Body Wt: 172 lb (78 kg),   · BMI Classification: BMI 35.0 - 39.9 Obese Class II    Estimated Intake vs Estimated Needs: Intake Meets Needs    Nutrition Risk Level: Low    Nutrition Interventions:   Continue current diet  Continued Inpatient Monitoring    Nutrition Evaluation:   · Evaluation: Goal achieved   · Goals: when diet resumes, po intake 75% or greater. · Monitoring: Meal Intake, Diet Tolerance, Skin Integrity, Weight, Pertinent Labs    See Adult Nutrition Doc Flowsheet for more detail.      Electronically signed by David Suresh MS, RD, LD on 8/4/18 at 12:59 PM    Contact Number: [phone]
Patient visited by Spiritual Care volunteer VONDA Martinez. Family present.
Pt has received medications and is resting. Notified physician about pain. Pt requested something for pain and to help him sleep. Dr. Priscilla Vang placed orders.   Pt was also requesting his lantus, but he is receiving sliding scale insulin instead while in the hospital. Electronically signed by Rodolfo Lanes, RN on 8/2/2018 at 11:54 PM
Yoanna Leyva is a 62 y.o. male patient.     Current Facility-Administered Medications   Medication Dose Route Frequency Provider Last Rate Last Dose    sodium chloride flush 0.9 % injection 10 mL  10 mL Intravenous 2 times per day Christie Yuen MD   10 mL at 08/04/18 2006    sodium chloride flush 0.9 % injection 10 mL  10 mL Intravenous PRN Christie Yuen MD        HYDROcodone-acetaminophen [facility]) 5-325 MG per tablet 1 tablet  1 tablet Oral Q4H PRN Christie Yuen MD   1 tablet at 08/05/18 0549    insulin lispro (HUMALOG) injection vial 0-18 Units  0-18 Units Subcutaneous 4x Daily AC & HS Evie Ward MD   6 Units at 08/04/18 1717    insulin lispro (HUMALOG) injection vial 0-9 Units  0-9 Units Subcutaneous Nightly Evie Ward MD   3 Units at 08/04/18 2206    glucose (GLUTOSE) 40 % oral gel 15 g  15 g Oral PRN Evie Ward MD        dextrose 50 % solution 12.5 g  12.5 g Intravenous PRN Evie Ward MD        glucagon (rDNA) injection 1 mg  1 mg Intramuscular PRN Evie Ward MD        dextrose 5 % solution  100 mL/hr Intravenous PRN Evie Ward MD        sodium chloride flush 0.9 % injection 10 mL  10 mL Intravenous 2 times per day Bryce Falk MD   10 mL at 08/04/18 0925    sodium chloride flush 0.9 % injection 10 mL  10 mL Intravenous PRN Bryce Falk MD        acetaminophen (TYLENOL) tablet 650 mg  650 mg Oral Q4H PRN Bryce Falk MD   650 mg at 08/02/18 1954    magnesium hydroxide (MILK OF MAGNESIA) 400 MG/5ML suspension 30 mL  30 mL Oral Daily PRN Bryce Falk MD        ondansetron [facility]) injection 4 mg  4 mg Intravenous Q6H PRN Bryce Falk MD        enoxaparin (LOVENOX) injection 40 mg  40 mg Subcutaneous Daily Bryce Falk MD   40 mg at 08/04/18 1716    nitroGLYCERIN (NITROSTAT) SL tablet 0.4 mg  0.4 mg Sublingual Q5 Min PRN Bryce Falk MD        aspirin chewable tablet 81 mg  81 mg Oral Daily Bryce Falk MD   81 mg at 08/04/18 3805
[address]    Daily HOSPITAL Progress Note                            Date:  8/5/18  Patient: Joradn Teague  Admission:  8/2/2018  1:26 PM  Admit DX: Chest pain [R07.9]  Age:  62 y.o., 1960     LOS: 0 days           I personally saw the patient and rounded with:  Cath lab nurses RN, on 8/5/18      The observations documented in this note, including the assessment and plan are mine         Reason for initial evaluation or the patient's initial complaint    Chest pressure      SUBJECTIVE:      8/2/2018    Chief Complaint / Reason for the Visit   Follow up of:  Chest pressure and abnomal lul and hypertension    Family present and in room during examination:  Yes: lots      Specialty Problems     None          Current Status Today According to the patient:  \"feel ok\"    Subjective:  Mr. Jordan Teague is generally feeling unchanged. S/p cath with mild CAD and normal LVFX    Mr. Jordan Teague has the following cardiac complaints / symptoms today:    1. Chest pressure, good news with cath, low probability that the chest pressure represents myocardial ischemia    2. Abnormal lul, false positive    3.  Hypertension    The blood pressure for the lastr 36 hours has been:  Systolic (96PAJ), BERNABE:804 , Min:110 , ERE:893    Diastolic (60YTN), VBW:07, Min:70, Max:93        Jordan Teague is a 62 y.o. male with the following history as recorded in EpicCare:    Patient Active Problem List    Diagnosis Date Noted    Chest discomfort      Priority: High    Abnormal nuclear cardiac imaging test      Priority: High    Chest pain 08/02/2018     Priority: Low     Current Facility-Administered Medications   Medication Dose Route Frequency Provider Last Rate Last Dose    sodium chloride flush 0.9 % injection 10 mL  10 mL Intravenous 2 times per day Faina Murray MD   10 mL at 08/05/18 0837    sodium chloride flush 0.9 % injection 10 mL  10 mL Intravenous PRN MD Zev Monteiro
patient will be nothing by mouth after midnight.     Hypertension: Controlled. Continue current therapy.     Analy Martinez MD  8/4/2018